# Patient Record
Sex: MALE | Race: WHITE
[De-identification: names, ages, dates, MRNs, and addresses within clinical notes are randomized per-mention and may not be internally consistent; named-entity substitution may affect disease eponyms.]

---

## 2019-07-15 ENCOUNTER — HOSPITAL ENCOUNTER (EMERGENCY)
Dept: HOSPITAL 7 - FB.ED | Age: 24
Discharge: HOME | End: 2019-07-15
Payer: COMMERCIAL

## 2019-07-15 DIAGNOSIS — F10.920: Primary | ICD-10-CM
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
## 2019-07-15 DIAGNOSIS — Z87.891: ICD-10-CM

## 2019-07-15 PROCEDURE — 99284 EMERGENCY DEPT VISIT MOD MDM: CPT

## 2019-07-15 PROCEDURE — 96374 THER/PROPH/DIAG INJ IV PUSH: CPT

## 2019-07-15 PROCEDURE — 85025 COMPLETE CBC W/AUTO DIFF WBC: CPT

## 2019-07-15 PROCEDURE — 36415 COLL VENOUS BLD VENIPUNCTURE: CPT

## 2019-07-15 PROCEDURE — 80048 BASIC METABOLIC PNL TOTAL CA: CPT

## 2019-07-15 PROCEDURE — G0480 DRUG TEST DEF 1-7 CLASSES: HCPCS

## 2019-07-15 NOTE — EDM.PDOC
ED HPI GENERAL MEDICAL PROBLEM





- General


Chief Complaint: General


Stated Complaint: INTOXICATED


Time Seen by Provider: 07/15/19 22:40


Source of Information: Reports: Patient


History Limitations: Reports: No Limitations





- History of Present Illness


INITIAL COMMENTS - FREE TEXT/NARRATIVE: 





Ozzy comes into [facility] ED by EMS following ingestion of an unknown quantity of 

ETOH beginning at 7:30 pm this evening. After several drinks, he developed 

tremors, delirium, and subsequent episodes of vomiting of partially digested 

food and beverages. He denies abdominal pain, headache, dizziness, 

hallucinations, or letitia confusion. Upon arrival, he was awake, alert, and 

wretching. An IV access was established in the RUE, and he was adminstered 

Zofran 8 mg IV with subsequent improvement in sxs. He denies illicit drug 

abuse. There is a remote hx of seizures about 3 years ago.





- Related Data


 Allergies











Allergy/AdvReac Type Severity Reaction Status Date / Time


 


No Known Allergies Allergy   Verified 07/15/19 22:29











Home Meds: 


 Home Meds





Escitalopram [Lexapro] 20 mg PO BEDTIME 07/15/19 [History]











Past Medical History


Neurological History: Reports: Seizure, Other (See Below)


Other Neuro History: Has not had a seizure in 3 years.


Psychiatric History: Reports: Anxiety, Depression, Panic Attack





- Past Surgical History


HEENT Surgical History: Reports: Oral Surgery





Social & Family History





- Tobacco Use


Smoking Status *Q: Former Smoker


Used Tobacco, but Quit: Yes


Month/Year Tobacco Last Used: 2018





- Recreational Drug Use


Recreational Drug Use: No





ED ROS GENERAL





- Review of Systems


Review Of Systems: ROS reveals no pertinent complaints other than HPI.





ED EXAM, GENERAL





- Physical Exam


Exam: See Below


Exam Limited By: No Limitations


General Appearance: Alert, WD/WN, No Apparent Distress


Eye Exam: Bilateral Eye: EOMI, Normal Inspection, PERRL


Ears: Normal External Exam


Nose: Normal Inspection


Throat/Mouth: Normal Inspection, Normal Lips, Normal Gums, Normal Oropharynx, 

Normal Voice, No Airway Compromise


Head: Normocephalic


Neck: Normal Inspection, Supple, Non-Tender


Respiratory/Chest: Lungs Clear, Normal Breath Sounds


Cardiovascular: Normal Peripheral Pulses, Regular Rate, Rhythm, No Murmur


GI/Abdominal: Normal Bowel Sounds, Soft, Non-Tender, No Organomegaly, No 

Distention, No Mass


 (Male) Exam: Deferred


Rectal (Males) Exam: Deferred


Back Exam: Normal Inspection


Extremities: Normal Inspection


Neurological: Alert, Oriented, CN II-XII Intact, Normal Cognition, No Motor/

Sensory Deficits


Psychiatric: Normal Affect, Anxious


Skin Exam: Warm, Dry, Intact, Normal Color


Lymphatic: No Adenopathy





Course





- Vital Signs


Text/Narrative:: 





Ozzy remained stable at the [facility] ED. Screening labs were baseline, with BA 

0.15 consistent with intoxication. 


Last Recorded V/S: 


 Last Vital Signs











Temp  36.4 C   07/15/19 22:25


 


Pulse  98   07/15/19 22:25


 


Resp  13   07/15/19 22:25


 


BP  147/94 H  07/15/19 22:25


 


Pulse Ox  99   07/15/19 22:25














- Orders/Labs/Meds


Orders: 


 Active Orders 24 hr











 Category Date Time Status


 


 DRUG SCREEN, URINE ALERE [URCHEM] Stat Lab  07/15/19 22:44 Ordered


 


 Sodium Chloride 0.9% [Saline Flush] Med  07/15/19 22:44 Active





 10 ml FLUSH ASDIRECTED PRN   


 


 Peripheral IV Insertion Adult [OM.PC] Routine Oth  07/15/19 22:44 Ordered








 Medication Orders





Sodium Chloride (Saline Flush)  10 ml FLUSH ASDIRECTED PRN


   PRN Reason: Keep Vein Open


   Last Admin: 07/15/19 22:48  Dose: 10 ml








Labs: 


 Laboratory Tests











  07/15/19 07/15/19 07/15/19 Range/Units





  23:00 23:00 23:00 


 


WBC  12.3 H    (4.5-12.0)  X10-3/uL


 


RBC  5.14    (4.30-5.75)  x10(6)uL


 


Hgb  15.7    (13.5-17.8)  g/dL


 


Hct  46.0    (30.0-51.3)  %


 


MCV  89.6    (80-96)  fL


 


MCH  30.6    (27.7-33.6)  pg


 


MCHC  34.2    (32.2-35.4)  g/dL


 


RDW  12.7    (11.5-15.5)  %


 


Plt Count  264    (125-369)  X10(3)uL


 


MPV  7.9    (7.4-10.4)  fL


 


Neut % (Auto)  76.5    (46-82)  %


 


Lymph % (Auto)  16.9    (13-37)  %


 


Mono % (Auto)  5.0    (4-12)  %


 


Eos % (Auto)  1    (1.0-5.0)  %


 


Baso % (Auto)  1    (0-2)  %


 


Neut # (Auto)  9.4 H    (1.6-8.3)  #


 


Lymph # (Auto)  2.1    (0.6-5.0)  #


 


Mono # (Auto)  0.6    (0.0-1.3)  #


 


Eos # (Auto)  0.1    (0.0-0.8)  #


 


Baso # (Auto)  0.1    (0.0-0.2)  #


 


Sodium   142   (135-145)  mmol/L


 


Potassium   3.4 L   (3.5-5.3)  mmol/L


 


Chloride   104   (100-110)  mmol/L


 


Carbon Dioxide   23   (21-32)  mmol/L


 


BUN   10   (7-18)  mg/dL


 


Creatinine   1.0   (0.70-1.30)  mg/dL


 


Est Cr Clr Drug Dosing   102.79   mL/min


 


Estimated GFR (MDRD)   > 60   (>60)  


 


BUN/Creatinine Ratio   10.0   (9-20)  


 


Glucose   121 H   ()  mg/dL


 


Calcium   8.6   (8.6-10.2)  mg/dL


 


Ethyl Alcohol    0.15 H*  (<0.03)  %











Meds: 


Medications











Generic Name Dose Route Start Last Admin





  Trade Name Freq  PRN Reason Stop Dose Admin


 


Sodium Chloride  10 ml  07/15/19 22:44  07/15/19 22:48





  Saline Flush  FLUSH   10 ml





  ASDIRECTED PRN   Administration





  Keep Vein Open   





     





     





     














Discontinued Medications














Generic Name Dose Route Start Last Admin





  Trade Name Freq  PRN Reason Stop Dose Admin


 


Ondansetron HCl  8 mg  07/15/19 22:40  07/15/19 22:48





  Zofran  IVPUSH  07/15/19 22:41  8 mg





  ONETIME ONE   Administration





     





     





     





     














Departure





- Departure


Time of Disposition: 23:33


Disposition: Home, Self-Care 01


Condition: Fair


Clinical Impression: 


Acute alcoholic intoxication


Qualifiers:


 Complication of substance-induced condition: uncomplicated Qualified Code(s): 

F10.920 - Alcohol use, unspecified with intoxication, uncomplicated








- Discharge Information


*PRESCRIPTION DRUG MONITORING PROGRAM REVIEWED*: Not Applicable


*COPY OF PRESCRIPTION DRUG MONITORING REPORT IN PATIENT [place]: Not Applicable


Referrals: 


PCP,None [Primary Care Provider] - 


Forms:  ED Department Discharge





- Problem List & Annotations


(1) Acute alcoholic intoxication


SNOMED Code(s): 00937386, 64647657


   Code(s): F10.929 - ALCOHOL USE, UNSPECIFIED WITH INTOXICATION, UNSPECIFIED   

Status: Acute   Annotation/Comment:: Advised hydration and rest, and 

abstinence.    


Qualifiers: 


   Complication of substance-induced condition: uncomplicated   Qualified Code(s

): F10.920 - Alcohol use, unspecified with intoxication, uncomplicated   





- Problem List Review


Problem List Initiated/Reviewed/Updated: Yes





- My Orders


Last 24 Hours: 


My Active Orders





07/15/19 22:44


DRUG SCREEN, URINE ALERE [URCHEM] Stat 


Sodium Chloride 0.9% [Saline Flush]   10 ml FLUSH ASDIRECTED PRN 


Peripheral IV Insertion Adult [OM.PC] Routine 














- Assessment/Plan


Last 24 Hours: 


My Active Orders





07/15/19 22:44


DRUG SCREEN, URINE ALERE [URCHEM] Stat 


Sodium Chloride 0.9% [Saline Flush]   10 ml FLUSH ASDIRECTED PRN 


Peripheral IV Insertion Adult [OM.PC] Routine 











Plan: 





Follow up with PCP if needed.